# Patient Record
Sex: MALE | URBAN - NONMETROPOLITAN AREA
[De-identification: names, ages, dates, MRNs, and addresses within clinical notes are randomized per-mention and may not be internally consistent; named-entity substitution may affect disease eponyms.]

---

## 2019-04-04 ENCOUNTER — IMPORTED ENCOUNTER (OUTPATIENT)
Dept: URBAN - NONMETROPOLITAN AREA CLINIC 1 | Facility: CLINIC | Age: 24
End: 2019-04-04

## 2019-04-04 PROBLEM — T15.02XA: Noted: 2019-04-04

## 2019-04-04 PROCEDURE — 65222 REMOVE FOREIGN BODY FROM EYE: CPT

## 2019-04-04 PROCEDURE — 99203 OFFICE O/P NEW LOW 30 MIN: CPT

## 2019-04-04 NOTE — PATIENT DISCUSSION
Cornea Foreign Body OS- Rust ring embedded in cornea removed with FB spud today at slit lamp without difficulty. - 1 gtt of Atropine given to pt prior to leaving the office.- Recommend Ketorolac QID OS

## 2022-04-10 ASSESSMENT — VISUAL ACUITY
OS_PH: 20/40
OS_CC: 20/50-
OD_CC: 20/30-

## 2022-04-10 ASSESSMENT — TONOMETRY: OD_IOP_MMHG: 14
